# Patient Record
Sex: FEMALE | Race: WHITE | NOT HISPANIC OR LATINO | URBAN - METROPOLITAN AREA
[De-identification: names, ages, dates, MRNs, and addresses within clinical notes are randomized per-mention and may not be internally consistent; named-entity substitution may affect disease eponyms.]

---

## 2018-04-19 ENCOUNTER — IMPORTED ENCOUNTER (OUTPATIENT)
Dept: URBAN - METROPOLITAN AREA CLINIC 31 | Facility: CLINIC | Age: 48
End: 2018-04-19

## 2018-04-19 PROBLEM — H01.119: Noted: 2018-04-19

## 2018-04-19 PROCEDURE — 92012 INTRM OPH EXAM EST PATIENT: CPT

## 2018-04-19 NOTE — PATIENT DISCUSSION
Contact Dermatitis OU - The eyelids are swollen secondary to contact with an allergen. Lotemax jose to lids tid. F/U CE one week.

## 2018-04-24 ENCOUNTER — IMPORTED ENCOUNTER (OUTPATIENT)
Dept: URBAN - METROPOLITAN AREA CLINIC 31 | Facility: CLINIC | Age: 48
End: 2018-04-24

## 2018-04-24 PROCEDURE — 92310 CONTACT LENS FITTING OU: CPT

## 2018-04-24 PROCEDURE — 92015 DETERMINE REFRACTIVE STATE: CPT

## 2018-04-24 PROCEDURE — 92014 COMPRE OPH EXAM EST PT 1/>: CPT

## 2018-04-24 NOTE — PATIENT DISCUSSION
Refractive error Annual Good ocular health documented. Discussed options of glasses contacts or refractive surgery. Discussed importance of annual eye exams. Work station glasses.

## 2022-04-01 ASSESSMENT — VISUAL ACUITY
OS_CC: 20/25-1
OD_CC: 20/25
OS_CC: 20/30
OS_SC: J314''
OD_CC: 20/25
OD_SC: J214''

## 2022-04-01 ASSESSMENT — TONOMETRY
OD_IOP_MMHG: 14
OS_IOP_MMHG: 12

## 2022-07-09 ENCOUNTER — TELEPHONE ENCOUNTER (OUTPATIENT)
Dept: URBAN - METROPOLITAN AREA CLINIC 121 | Facility: CLINIC | Age: 52
End: 2022-07-09

## 2022-07-09 RX ORDER — OMEPRAZOLE 40 MG/1
CAPSULE, DELAYED RELEASE ORAL ONCE A DAY
Refills: 0 | OUTPATIENT
Start: 2014-07-16 | End: 2015-01-14

## 2022-07-09 RX ORDER — TRAZODONE HYDROCHLORIDE 50 MG/1
TABLET ORAL ONCE A DAY
Refills: 0 | OUTPATIENT
Start: 2014-07-16 | End: 2017-02-15

## 2022-07-10 ENCOUNTER — TELEPHONE ENCOUNTER (OUTPATIENT)
Dept: URBAN - METROPOLITAN AREA CLINIC 121 | Facility: CLINIC | Age: 52
End: 2022-07-10

## 2022-07-10 RX ORDER — PANTOPRAZOLE SODIUM 40 MG/1
TABLET, DELAYED RELEASE ORAL TWICE A DAY
Refills: 5 | Status: ACTIVE | COMMUNITY
Start: 2014-12-03

## 2022-07-10 RX ORDER — TRAZODONE HYDROCHLORIDE 50 MG/1
TABLET ORAL ONCE A DAY
Refills: 0 | Status: ACTIVE | COMMUNITY
Start: 2017-02-15